# Patient Record
(demographics unavailable — no encounter records)

---

## 2024-10-15 NOTE — HISTORY OF PRESENT ILLNESS
[FreeTextEntry1] : spots [de-identified] : LOWELL ANDERSON is a 62 year old F who present for f/u as below.  LV 7/2024 (may fbse)   #Cyst L breast  #Pink spot on chest  #Hand dermatitis - flaring since using scented soap in cruise   #Itching in vulva. Tried mid-potency cortisone creams.  Biopsied 2/2021 on R labia majora, showing squamous mucosa with inflammation, reactive changes, parakeratosis, and hyperkeratosis. Reviewed by internal dermpath team; c/w chronic irritant dermatitis. No evidence of lichen sclerosus; rebiopsy if clinically necessary.  - 7/2024: pt used clobetasol BID x3 weeks since LV may 2024, then using 2x per week. Hasn't been itchy until recently used a new soap.  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

## 2024-10-15 NOTE — ASSESSMENT
[FreeTextEntry1] : #Inflamed Seborrheic Keratosis -x1, chest - Given inflammatory nature of lesions above, will treat with cryosurgery - Patient was verbally consented. Lesions treated with liquid nitrogen f/t/f x2 cycles. Patient tolerated it well. - Side effects include blister formation, hypopigmentation, and scarring - Wound care with vaseline  #Actinic Keratoses x1, chest - Patient was verbally consented and the lesions identified above were treated with liquid nitrogen freeze, thaw, freeze x 10 seconds each cycle x 2. Side effects include blister formation, hypopigmentation, and scarring. The patient tolerated the procedure well.  Wound care instructions, care of a blister with vaseline, signs and symptoms of infection were discussed in full.  The patient denies any questions at this time.  #Epidermal inclusion cyst/Dilated pore, L breast - Clinically benign - No treatment necessary unless lesion becomes bothersome - If lesion is bothersome, it can be excised surgically. Risks (scar, infection, bleeding, recurrence) and benefits of procedure discussed with patient; patient expressed understanding and wishes to decline.    #Hand dermatitis, ICD - chronic; flaring - Diagnosis, chronic nature, disease course, treatment options and goals of therapy discussed - Moisturize frequently with vaseline or Norwegian hand cream  - Apply clobetasol 0.05% ointment BID to affected areas for 2 weeks on/1 week OFF. SED.   #Vulvar dermatitis - chronic; stable Reviewed vulvar biopsy from 2021 with dermpath Dr. Patel --Nonspecific inflammatory changes, DDX chronic irritant dermatitis (Would re-biopsy if clinical suspicion is different) - Will monitor for changes - Continue PRN clobetasol ointment for flares, otherwise will not do maintenance tx for now - If flaring in future, will re-biopsy given clinical suspicion for early LS at may 2024 visit. Exam today WNL except absent labia minora (which may be normal variant; no other signs of sclerosis)

## 2024-10-15 NOTE — PHYSICAL EXAM
[Alert] : alert [Oriented x 3] : ~L oriented x 3 [Well Nourished] : well nourished [Conjunctiva Non-injected] : conjunctiva non-injected [No Visual Lymphadenopathy] : no visual  lymphadenopathy [No Clubbing] : no clubbing [No Edema] : no edema [No Bromhidrosis] : no bromhidrosis [No Chromhidrosis] : no chromhidrosis [Declined] : declined [FreeTextEntry3] : Focused exam only (see below) per patient request:  pink gritty papule central chest; adjacent inflamed SK lentigines and SKs on chest cystic small papule L lateral breast  fissuring distal fingertips with xerosis  labia majora and clitoral roldan WNL; no sclerosis, fissures, scarring absent labia minora

## 2025-04-10 NOTE — ASSESSMENT
[FreeTextEntry1] : Patient is a 62yo female who presents to the office complaining of rash on the bilateral lower legs and abdomen.  Started after course of Macrobid for UTI on a cruise ship.  Pt also had flu-lilke symptoms after starting Macrobid, but was unsure if that was a med reaction or if she also had a virus as she was on a cruise ship and exposed to other sick people.  Was seen at  and told was concerned for petechiae/vasculitis, presents today for b/w and further evaluation.  Pt is feeling much better from UTI/viral standpoint, just has this rash that is not bothering her in any way.  Rash - Labs drawn in office. - No itching/burning/blistering.  Pt will avoid topicals for now. - Pt completed course of Macrobid with resolution of UTI symptoms, no need for further treatment/workup. - Call the office or go to the ED immediately if you develop new, worsening or concerning symptoms including high fever, severe headache/worst headache of your life, confusion, dizziness/lightheadedness, loss of consciousness, severe chest pain, difficulty breathing, shortness of breath, severe abdominal pain, excessive vomiting/diarrhea, inability to feel/move the extremities, or any other concerning symptoms.

## 2025-04-10 NOTE — HISTORY OF PRESENT ILLNESS
[FreeTextEntry8] : Pt is a 62yo female who presents to the office complaining of rash. Pt developed UTI symptoms on a cruise on 4/1/25. Seen by boat doctor 4/2/25, dx UTI, had a fever as well. Prescribed Macrobid, Pyridium. States was not feeling well once she started medication.  Felt chills, aches, hot/cold sensation, fatigue, lungs felt discomfort. Seen at  Sunday when she returned from cruise, tested negative for flu/covid. Noticed rash on her leg, seen again at  Monday and they told her it was petechiae, vasculitis and she should see a vascular doctor. Pt thought could be a drug reaction to Macrobid. Pt stopped taking Macrobid on Tuesday (after 5 days), has been feeling better since. This morning noticed rash on abdomen now as well. Rash is not pruritic.  Crosses midline. Pt feels like she is getting better, does not feel flu-like.

## 2025-04-10 NOTE — PHYSICAL EXAM
[Normal Outer Ear/Nose] : the outer ears and nose were normal in appearance [No Edema] : there was no peripheral edema [Normal] : soft, non-tender, non-distended, no masses palpated, no HSM and normal bowel sounds [Coordination Grossly Intact] : coordination grossly intact [No Focal Deficits] : no focal deficits [Normal Gait] : normal gait [Normal Affect] : the affect was normal [Normal Insight/Judgement] : insight and judgment were intact [de-identified] : mild erythema in pharynx; no mucosal lesions [de-identified] : very mild erythema of the bilateral lower extremities, looks more like increased sun exposure than petechiae today; mild erythematous macular rash on the abdomen across the midline just above the umbilicus, does not extend to the back; no blistering; no skin sloughing

## 2025-04-10 NOTE — REVIEW OF SYSTEMS
[Fever] : fever [Earache] : no earache [Nasal Discharge] : no nasal discharge [Sore Throat] : sore throat [Itching] : no itching [Skin Rash] : skin rash [Negative] : Heme/Lymph [FreeTextEntry8] : UTI symptoms last week now resolved.

## 2025-05-22 NOTE — PHYSICAL EXAM
[Appropriately responsive] : appropriately responsive [Alert] : alert [No Acute Distress] : no acute distress [Soft] : soft [Non-tender] : non-tender [Non-distended] : non-distended [No HSM] : No HSM [No Lesions] : no lesions [No Mass] : no mass [Oriented x3] : oriented x3 [Examination Of The Breasts] : a normal appearance [No Masses] : no breast masses were palpable [Vulvar Atrophy] : vulvar atrophy [Labia Majora] : normal [Labia Majora Erythema] : erythema [Labia Minora] : normal [Labia Minora Erythema] : erythema [Atrophy] : atrophy [Normal] : normal [Tenderness] : nontender [Enlarged ___ wks] : not enlarged [Mass ___ cm] : no uterine mass was palpated [Uterine Adnexae] : non-palpable [No Tenderness] : no tenderness [FreeTextEntry5] : pap done, stenotic [FreeTextEntry9] : guaiac-

## 2025-05-22 NOTE — HISTORY OF PRESENT ILLNESS
[No] : Patient does not have concerns regarding sex [Currently Active] : currently active [Men] : men [Vaginal] : vaginal [TextBox_4] : no vb, still occ itching, using clobetasol now, rx'd by Dr. Fernandez. Takes vit d and exercises  [Mammogramdate] : 3/2024 [PapSmeardate] : 2/2023 [BoneDensityDate] : 1/2022 [ColonoscopyDate] : 8 yrs ago fu 10 [TextBox_43] : fu 10